# Patient Record
Sex: FEMALE | Race: WHITE | NOT HISPANIC OR LATINO | Employment: OTHER | URBAN - METROPOLITAN AREA
[De-identification: names, ages, dates, MRNs, and addresses within clinical notes are randomized per-mention and may not be internally consistent; named-entity substitution may affect disease eponyms.]

---

## 2020-07-07 ENCOUNTER — TELEPHONE (OUTPATIENT)
Dept: SURGERY | Facility: CLINIC | Age: 85
End: 2020-07-07

## 2020-07-07 NOTE — TELEPHONE ENCOUNTER
Spoke to pt's daughter she advised she will call back after she speaks to pt's nursing home about her being seen outside of the nursing home. - Gave daughter my name and call back number [phone]    ----- Message from Natalya Dang sent at 7/7/2020 11:49 AM CDT -----  Regarding: Advice  Type:  Needs Medical Advice    Who Called: Camila pt's daughter  Reason: would like to know if her mother should get the rubber band method for a hemorrhoid  Would the patient rather a call back or a response via [facility]? callback  Best Call Back Number: [phone]  Additional Information: referred Jalen Hummel